# Patient Record
Sex: MALE | Race: BLACK OR AFRICAN AMERICAN | URBAN - METROPOLITAN AREA
[De-identification: names, ages, dates, MRNs, and addresses within clinical notes are randomized per-mention and may not be internally consistent; named-entity substitution may affect disease eponyms.]

---

## 2023-01-17 ENCOUNTER — WEB ENCOUNTER (OUTPATIENT)
Dept: URBAN - METROPOLITAN AREA CLINIC 92 | Facility: CLINIC | Age: 53
End: 2023-01-17

## 2023-01-17 ENCOUNTER — OFFICE VISIT (OUTPATIENT)
Dept: URBAN - METROPOLITAN AREA CLINIC 92 | Facility: CLINIC | Age: 53
End: 2023-01-17
Payer: MEDICARE

## 2023-01-17 ENCOUNTER — DASHBOARD ENCOUNTERS (OUTPATIENT)
Age: 53
End: 2023-01-17

## 2023-01-17 VITALS
DIASTOLIC BLOOD PRESSURE: 82 MMHG | WEIGHT: 297 LBS | HEIGHT: 71 IN | BODY MASS INDEX: 41.58 KG/M2 | HEART RATE: 75 BPM | TEMPERATURE: 97.3 F | SYSTOLIC BLOOD PRESSURE: 150 MMHG

## 2023-01-17 DIAGNOSIS — R19.7 DIARRHEA, UNSPECIFIED TYPE: ICD-10-CM

## 2023-01-17 DIAGNOSIS — K20.90 ESOPHAGITIS: ICD-10-CM

## 2023-01-17 DIAGNOSIS — K31.84 GASTROPARESIS: ICD-10-CM

## 2023-01-17 DIAGNOSIS — Z86.010 HISTORY OF COLON POLYPS: ICD-10-CM

## 2023-01-17 PROBLEM — 428283002: Status: ACTIVE | Noted: 2023-01-17

## 2023-01-17 PROBLEM — 235675006: Status: ACTIVE | Noted: 2023-01-17

## 2023-01-17 PROCEDURE — 99214 OFFICE O/P EST MOD 30 MIN: CPT

## 2023-01-17 RX ORDER — ONDANSETRON HYDROCHLORIDE 4 MG/1
TABLET, FILM COATED ORAL
Qty: 0 | Refills: 0 | Status: ACTIVE | COMMUNITY
Start: 2017-12-08

## 2023-01-17 RX ORDER — SEVELAMER CARBONATE 800 MG/1
TABLET, FILM COATED ORAL
Qty: 0 | Refills: 0 | Status: ACTIVE | COMMUNITY
Start: 2017-11-30

## 2023-01-17 RX ORDER — FOLIC ACID 1 MG/1
TABLET ORAL
Qty: 0 | Refills: 0 | Status: ACTIVE | COMMUNITY
Start: 2017-08-21

## 2023-01-17 RX ORDER — BENZONATATE 100 MG/1
CAPSULE ORAL
Qty: 0 | Refills: 0 | Status: ACTIVE | COMMUNITY
Start: 2017-09-28

## 2023-01-17 RX ORDER — CLINDAMYCIN HYDROCHLORIDE 150 MG/1
CAPSULE ORAL
Qty: 0 | Refills: 0 | Status: ACTIVE | COMMUNITY
Start: 2017-09-28

## 2023-01-17 RX ORDER — CINACALCET HYDROCHLORIDE 30 MG/1
TABLET, COATED ORAL
Qty: 0 | Refills: 0 | Status: ACTIVE | COMMUNITY
Start: 2017-09-12

## 2023-01-17 NOTE — HPI-TODAY'S VISIT:
52-year-old male presents today with complaints of diarrhea.    He was last seen by Dr. Pacheco in 2019.  Per chart review patient has a history of gastroparesis complicated by severe reflux esophagitis.  He had a colonoscopy and endoscopy on  that showed LA grade D erosive esophagitis and retained fluid in the stomach.  There was irregularly shaped ulcer along the greater curvature with an adherent clot.  Multiple biopsies were obtained that excluded malignancy and H. pylori.  The colonoscopy demonstrated retained stool and random biopsies excluded microscopic colitis.  He had a gastric emptying scan on  which revealed delayed emptying consistent with gastroparesis.  Most recent EGD 2-2020 revealed LA grade B esophagitis which showed squamous mucosa with reflux type features 2 cm hiatal hernia, single 5 mm sessile polyp which showed regenerative antral mucosa with mild chronic inflammation, clear fluid in the gastric body. He was on protonix 40 mg BID at that time.  Most recent colonoscopy 2-2020:Large amount of semisolid stool in entire colon, diverticula, internal hemorrhoids, repeat in 1 year due to suboptimal prep Per last note it was recommended if patient had persistent reflux esophagitis will consider referral for surgery for possible Niesen fundoplication. Today he states he cannot rememeber if he has had this yet.   Currently states he has been having increased diarrhea after eating meals for about 9 to 10 months.  He states after he eats a meal a few minutes later he will have a bout of loose stool.  He denies hematochezia, melena, weight loss, abdominal pain, nighttime sx.  He denies any family history of colon cancer, colon polyps, UC, Crohn's. He had a course of amoxicillin in October due to dental work but diarrhea started prior to this. He takes imodium prn for the diarrhea which helps.  He is not currently a diabetic but states prior to his dialysis treatment in 2017 he was.  He is currently on dialysis M,W,F.  He denies any history of pancreatitis. He also notes some fullness after he eats.  He does have a history of gastroparesis but has not seen a dietitian for this in the past.  He does have some nausea and vomiting 2-3 times a month.  Denies dysphagia, odynophagia, GERD symptoms, NSAID use.

## 2023-04-18 ENCOUNTER — OFFICE VISIT (OUTPATIENT)
Dept: URBAN - METROPOLITAN AREA CLINIC 29 | Facility: CLINIC | Age: 53
End: 2023-04-18

## 2023-05-16 ENCOUNTER — OFFICE VISIT (OUTPATIENT)
Dept: URBAN - METROPOLITAN AREA CLINIC 29 | Facility: CLINIC | Age: 53
End: 2023-05-16

## 2024-07-01 ENCOUNTER — OFFICE VISIT (OUTPATIENT)
Dept: URBAN - METROPOLITAN AREA CLINIC 92 | Facility: CLINIC | Age: 54
End: 2024-07-01
Payer: MEDICARE

## 2024-07-01 VITALS
WEIGHT: 294 LBS | TEMPERATURE: 97.7 F | HEIGHT: 71 IN | HEART RATE: 59 BPM | DIASTOLIC BLOOD PRESSURE: 80 MMHG | BODY MASS INDEX: 41.16 KG/M2 | SYSTOLIC BLOOD PRESSURE: 153 MMHG

## 2024-07-01 DIAGNOSIS — K21.00 GASTROESOPHAGEAL REFLUX DISEASE WITH ESOPHAGITIS WITHOUT HEMORRHAGE: ICD-10-CM

## 2024-07-01 DIAGNOSIS — R19.7 DIARRHEA, UNSPECIFIED TYPE: ICD-10-CM

## 2024-07-01 DIAGNOSIS — Z86.010 HISTORY OF COLON POLYPS: ICD-10-CM

## 2024-07-01 DIAGNOSIS — K31.84 GASTROPARESIS: ICD-10-CM

## 2024-07-01 PROBLEM — 266433003: Status: ACTIVE | Noted: 2024-07-01

## 2024-07-01 PROCEDURE — 99214 OFFICE O/P EST MOD 30 MIN: CPT | Performed by: INTERNAL MEDICINE

## 2024-07-01 RX ORDER — ATORVASTATIN CALCIUM 80 MG/1
1 TABLET TABLET, FILM COATED ORAL ONCE A DAY
Status: ACTIVE | COMMUNITY

## 2024-07-01 RX ORDER — NITROGLYCERIN 0.4 MG/1
AS DIRECTED TABLET SUBLINGUAL
Status: ACTIVE | COMMUNITY

## 2024-07-01 RX ORDER — CARVEDILOL 3.12 MG/1
1 TABLET WITH FOOD TABLET, FILM COATED ORAL TWICE A DAY
Status: ACTIVE | COMMUNITY

## 2024-07-01 RX ORDER — MONTELUKAST 10 MG/1
1 TABLET TABLET, FILM COATED ORAL ONCE A DAY
Status: ACTIVE | COMMUNITY

## 2024-07-01 RX ORDER — CALCITRIOL 0.5 UG/1
1 CAPSULE CAPSULE, LIQUID FILLED ORAL ONCE A DAY
Status: ACTIVE | COMMUNITY

## 2024-07-01 RX ORDER — ISOSORBIDE DINITRATE 20 MG/1
1 TABLET TABLET ORAL TWICE A DAY
Status: ACTIVE | COMMUNITY

## 2024-07-01 RX ORDER — HYDROXYZINE HYDROCHLORIDE 50 MG/1
1 TABLET AS NEEDED TABLET, FILM COATED ORAL ONCE A DAY
Status: ACTIVE | COMMUNITY

## 2024-07-01 RX ORDER — HYDRALAZINE HYDROCHLORIDE 100 MG/1
1 TABLET WITH FOOD TABLET, FILM COATED ORAL TWICE A DAY
Status: ACTIVE | COMMUNITY

## 2024-07-01 RX ORDER — SEMAGLUTIDE 0.68 MG/ML
AS DIRECTED INJECTION, SOLUTION SUBCUTANEOUS
Status: ACTIVE | COMMUNITY

## 2024-07-01 RX ORDER — AMIODARONE HCL 200 MG
1 TABLET TABLET ORAL ONCE A DAY
Status: ACTIVE | COMMUNITY

## 2024-07-01 RX ORDER — KRILL/OM-3/DHA/EPA/PHOSPHO/AST 1000-230MG
1 TABLET CAPSULE ORAL ONCE A DAY
Status: ACTIVE | COMMUNITY

## 2024-07-01 RX ORDER — SEVELAMER CARBONATE 800 MG/1
1 TABLET WITH MEALS TABLET, FILM COATED ORAL THREE TIMES A DAY
Status: ACTIVE | COMMUNITY

## 2024-07-01 RX ORDER — FOLIC ACID 1 MG/1
1 TABLET TABLET ORAL ONCE A DAY
Status: ACTIVE | COMMUNITY

## 2024-07-01 NOTE — HPI-TODAY'S VISIT:
This is a 53-year-old male who now presents for follow-up.  He has chronic diarrhea which she previously underwent colonoscopy in December 2017 with biopsies that exclude microscopic colitis.  He had a gastric emptying study that confirm delayed gastric emptying consistent with gastroparesis.  During his last visit, he was recommended to obtain stool studies and fecal elastase but did not complete the test. he continues to have chronic diarrhea with frequent liquids bowel movements 3 times a week without overt bleeding.  There is associated fever, chills, or constitutional symptoms including unintentional weight loss.  He continues to reflux that is controlled on Protonix.  He underwent EGD on 02/11/2020 that showed LA grade B esophagitis and 2 cm hiatal hernia.  Biopsying confirm reflux changes and there was a chronic inflammation without H pylori and regenerative inflammatory polyp in the stomach.  Colonoscopy on 02/11/2020 showed retained stool and diverticulosis in the ascending, descending, and sigmoid colon and internal hemorrhoids. He was recommended to repeat the procedure in 1 year but has not yet done so.  He has ESRD and is on HD on MWF.  He recently had acute MI on 03/07/2024 that required PCI with 4 stent placement.  He developed cardiac arrest and subsequent coli requiring AICD placement.  He is currently on ASA and Brilinta

## 2024-07-08 ENCOUNTER — LAB OUTSIDE AN ENCOUNTER (OUTPATIENT)
Dept: URBAN - METROPOLITAN AREA CLINIC 92 | Facility: CLINIC | Age: 54
End: 2024-07-08

## 2024-07-10 LAB
ADENOVIRUS F 40/41: NOT DETECTED
CAMPYLOBACTER: NOT DETECTED
CLOSTRIDIUM DIFFICILE: NOT DETECTED
ENTAMOEBA HISTOLYTICA: NOT DETECTED
ENTEROAGGREGATIVE E.COLI: NOT DETECTED
ENTEROTOXIGENIC E.COLI: NOT DETECTED
ESCHERICHIA COLI O157: NOT DETECTED
GIARDIA LAMBLIA: NOT DETECTED
NOROVIRUS GI/GII: NOT DETECTED
ROTAVIRUS A: NOT DETECTED
SALMONELLA SPP.: NOT DETECTED
SHIGA-LIKE TOXIN PRODUCING E.COLI: NOT DETECTED
SHIGELLA SPP. / ENTEROINVASIVE E.COLI: NOT DETECTED
VIBRIO PARAHAEMOLYTICUS: NOT DETECTED
VIBRIO SPP.: NOT DETECTED
YERSINIA ENTEROCOLITICA: NOT DETECTED

## 2024-07-25 ENCOUNTER — OFFICE VISIT (OUTPATIENT)
Dept: URBAN - METROPOLITAN AREA CLINIC 92 | Facility: CLINIC | Age: 54
End: 2024-07-25

## 2024-07-25 RX ORDER — FOLIC ACID 1 MG/1
1 TABLET TABLET ORAL ONCE A DAY
COMMUNITY

## 2024-07-25 RX ORDER — SEVELAMER CARBONATE 800 MG/1
1 TABLET WITH MEALS TABLET, FILM COATED ORAL THREE TIMES A DAY
COMMUNITY

## 2024-07-25 RX ORDER — AMIODARONE HCL 200 MG
1 TABLET TABLET ORAL ONCE A DAY
COMMUNITY

## 2024-07-25 RX ORDER — NITROGLYCERIN 0.4 MG/1
AS DIRECTED TABLET SUBLINGUAL
COMMUNITY

## 2024-07-25 RX ORDER — ISOSORBIDE DINITRATE 20 MG/1
1 TABLET TABLET ORAL TWICE A DAY
COMMUNITY

## 2024-07-25 RX ORDER — KRILL/OM-3/DHA/EPA/PHOSPHO/AST 1000-230MG
1 TABLET CAPSULE ORAL ONCE A DAY
COMMUNITY

## 2024-07-25 RX ORDER — MONTELUKAST 10 MG/1
1 TABLET TABLET, FILM COATED ORAL ONCE A DAY
COMMUNITY

## 2024-07-25 RX ORDER — HYDRALAZINE HYDROCHLORIDE 100 MG/1
1 TABLET WITH FOOD TABLET, FILM COATED ORAL TWICE A DAY
COMMUNITY

## 2024-07-25 RX ORDER — SEMAGLUTIDE 0.68 MG/ML
AS DIRECTED INJECTION, SOLUTION SUBCUTANEOUS
COMMUNITY

## 2024-07-25 RX ORDER — CALCITRIOL 0.5 UG/1
1 CAPSULE CAPSULE, LIQUID FILLED ORAL ONCE A DAY
COMMUNITY

## 2024-07-25 RX ORDER — ATORVASTATIN CALCIUM 80 MG/1
1 TABLET TABLET, FILM COATED ORAL ONCE A DAY
COMMUNITY

## 2024-07-25 RX ORDER — CARVEDILOL 3.12 MG/1
1 TABLET WITH FOOD TABLET, FILM COATED ORAL TWICE A DAY
COMMUNITY

## 2024-07-25 RX ORDER — HYDROXYZINE HYDROCHLORIDE 50 MG/1
1 TABLET AS NEEDED TABLET, FILM COATED ORAL ONCE A DAY
COMMUNITY

## 2024-08-13 ENCOUNTER — OFFICE VISIT (OUTPATIENT)
Dept: URBAN - METROPOLITAN AREA CLINIC 92 | Facility: CLINIC | Age: 54
End: 2024-08-13
Payer: MEDICARE

## 2024-08-13 VITALS
SYSTOLIC BLOOD PRESSURE: 174 MMHG | WEIGHT: 280 LBS | TEMPERATURE: 97.6 F | DIASTOLIC BLOOD PRESSURE: 106 MMHG | BODY MASS INDEX: 39.2 KG/M2 | HEART RATE: 93 BPM | HEIGHT: 71 IN

## 2024-08-13 DIAGNOSIS — Z86.010 HISTORY OF COLON POLYPS: ICD-10-CM

## 2024-08-13 DIAGNOSIS — K21.00 GASTROESOPHAGEAL REFLUX DISEASE WITH ESOPHAGITIS WITHOUT HEMORRHAGE: ICD-10-CM

## 2024-08-13 DIAGNOSIS — K31.84 GASTROPARESIS: ICD-10-CM

## 2024-08-13 DIAGNOSIS — R19.7 DIARRHEA, UNSPECIFIED TYPE: ICD-10-CM

## 2024-08-13 PROCEDURE — 99213 OFFICE O/P EST LOW 20 MIN: CPT

## 2024-08-13 RX ORDER — FOLIC ACID 1 MG/1
1 TABLET TABLET ORAL ONCE A DAY
Status: ACTIVE | COMMUNITY

## 2024-08-13 RX ORDER — CARVEDILOL 3.12 MG/1
1 TABLET WITH FOOD TABLET, FILM COATED ORAL TWICE A DAY
Status: ACTIVE | COMMUNITY

## 2024-08-13 RX ORDER — AMIODARONE HCL 200 MG
1 TABLET TABLET ORAL ONCE A DAY
Status: ACTIVE | COMMUNITY

## 2024-08-13 RX ORDER — CALCITRIOL 0.5 UG/1
1 CAPSULE CAPSULE, LIQUID FILLED ORAL ONCE A DAY
Status: ACTIVE | COMMUNITY

## 2024-08-13 RX ORDER — ISOSORBIDE DINITRATE 20 MG/1
1 TABLET TABLET ORAL TWICE A DAY
Status: ACTIVE | COMMUNITY

## 2024-08-13 RX ORDER — NITROGLYCERIN 0.4 MG/1
AS DIRECTED TABLET SUBLINGUAL
Status: ACTIVE | COMMUNITY

## 2024-08-13 RX ORDER — HYDRALAZINE HYDROCHLORIDE 100 MG/1
1 TABLET WITH FOOD TABLET, FILM COATED ORAL TWICE A DAY
Status: ACTIVE | COMMUNITY

## 2024-08-13 RX ORDER — KRILL/OM-3/DHA/EPA/PHOSPHO/AST 1000-230MG
1 TABLET CAPSULE ORAL ONCE A DAY
Status: ACTIVE | COMMUNITY

## 2024-08-13 RX ORDER — ATORVASTATIN CALCIUM 80 MG/1
1 TABLET TABLET, FILM COATED ORAL ONCE A DAY
Status: ACTIVE | COMMUNITY

## 2024-08-13 RX ORDER — HYDROXYZINE HYDROCHLORIDE 50 MG/1
1 TABLET AS NEEDED TABLET, FILM COATED ORAL ONCE A DAY
Status: ACTIVE | COMMUNITY

## 2024-08-13 RX ORDER — SEMAGLUTIDE 0.68 MG/ML
AS DIRECTED INJECTION, SOLUTION SUBCUTANEOUS
Status: ACTIVE | COMMUNITY

## 2024-08-13 RX ORDER — MONTELUKAST 10 MG/1
1 TABLET TABLET, FILM COATED ORAL ONCE A DAY
Status: ACTIVE | COMMUNITY

## 2024-08-13 RX ORDER — SEVELAMER CARBONATE 800 MG/1
1 TABLET WITH MEALS TABLET, FILM COATED ORAL THREE TIMES A DAY
Status: ACTIVE | COMMUNITY

## 2024-08-13 NOTE — HPI-TODAY'S VISIT:
This is a 53-year-old male who now presents for follow-up.  He has chronic diarrhea which she previously underwent colonoscopy in December 2017 with biopsies that exclude microscopic colitis.  He had a gastric emptying study that confirm delayed gastric emptying consistent with gastroparesis.  He had infectious stool panel after last visit but did not have pancreatic elastase or calpro done. His infecion panel was neg. He continues to have chronic diarrhea with frequent liquids bowel movements 1-2 times a week without overt bleeding.  There is associated fever, chills, or constitutional symptoms including unintentional weight loss.   He underwent EGD on 02/11/2020 that showed LA grade B esophagitis and 2 cm hiatal hernia.  Biopsying confirm reflux changes and there was a chronic inflammation without H pylori and regenerative inflammatory polyp in the stomach.  Colonoscopy on 02/11/2020 showed retained stool and diverticulosis in the ascending, descending, and sigmoid colon and internal hemorrhoids. He was recommended to repeat the procedure in 1 year but has not yet done so.  He has ESRD and is on HD on MWF.  He recently had acute MI on 03/07/2024 that required PCI with 4 stent placement.  He developed cardiac arrest and subsequent coli requiring AICD placement.  He is currently on ASA and Brilinta  He was seen at [place] ED last week due to n/v/d. He had labs that showed normal lipase and lfts. CT showed no acute findings. Sx resolved on their own one day after hospital visit.

## 2024-09-17 ENCOUNTER — TELEPHONE ENCOUNTER (OUTPATIENT)
Dept: URBAN - METROPOLITAN AREA CLINIC 92 | Facility: CLINIC | Age: 54
End: 2024-09-17

## 2024-09-17 RX ORDER — PANCRELIPASE LIPASE, PANCRELIPASE PROTEASE, PANCRELIPASE AMYLASE 40000; 126000; 168000 [USP'U]/1; [USP'U]/1; [USP'U]/1
2 CAPSULES WITH MEALS AND 1 WITH SNACKS CAPSULE, DELAYED RELEASE ORAL
Qty: 900 CAPSULES | Refills: 3 | OUTPATIENT
Start: 2024-09-17 | End: 2025-09-12

## 2024-09-18 ENCOUNTER — TELEPHONE ENCOUNTER (OUTPATIENT)
Dept: URBAN - METROPOLITAN AREA CLINIC 92 | Facility: CLINIC | Age: 54
End: 2024-09-18

## 2024-10-15 ENCOUNTER — OFFICE VISIT (OUTPATIENT)
Dept: URBAN - METROPOLITAN AREA CLINIC 92 | Facility: CLINIC | Age: 54
End: 2024-10-15

## 2024-10-15 RX ORDER — ISOSORBIDE DINITRATE 20 MG/1
1 TABLET TABLET ORAL TWICE A DAY
COMMUNITY

## 2024-10-15 RX ORDER — HYDROXYZINE HYDROCHLORIDE 50 MG/1
1 TABLET AS NEEDED TABLET, FILM COATED ORAL ONCE A DAY
COMMUNITY

## 2024-10-15 RX ORDER — PANCRELIPASE LIPASE, PANCRELIPASE PROTEASE, PANCRELIPASE AMYLASE 40000; 126000; 168000 [USP'U]/1; [USP'U]/1; [USP'U]/1
2 CAPSULES WITH MEALS AND 1 WITH SNACKS CAPSULE, DELAYED RELEASE ORAL
Qty: 900 CAPSULES | Refills: 3 | COMMUNITY
Start: 2024-09-17 | End: 2025-09-12

## 2024-10-15 RX ORDER — MONTELUKAST 10 MG/1
1 TABLET TABLET, FILM COATED ORAL ONCE A DAY
COMMUNITY

## 2024-10-15 RX ORDER — SEMAGLUTIDE 0.68 MG/ML
AS DIRECTED INJECTION, SOLUTION SUBCUTANEOUS
COMMUNITY

## 2024-10-15 RX ORDER — AMIODARONE HCL 200 MG
1 TABLET TABLET ORAL ONCE A DAY
COMMUNITY

## 2024-10-15 RX ORDER — ATORVASTATIN CALCIUM 80 MG/1
1 TABLET TABLET, FILM COATED ORAL ONCE A DAY
COMMUNITY

## 2024-10-15 RX ORDER — SEVELAMER CARBONATE 800 MG/1
1 TABLET WITH MEALS TABLET, FILM COATED ORAL THREE TIMES A DAY
COMMUNITY

## 2024-10-15 RX ORDER — KRILL/OM-3/DHA/EPA/PHOSPHO/AST 1000-230MG
1 TABLET CAPSULE ORAL ONCE A DAY
COMMUNITY

## 2024-10-15 RX ORDER — NITROGLYCERIN 0.4 MG/1
AS DIRECTED TABLET SUBLINGUAL
COMMUNITY

## 2024-10-15 RX ORDER — CALCITRIOL 0.5 UG/1
1 CAPSULE CAPSULE, LIQUID FILLED ORAL ONCE A DAY
COMMUNITY

## 2024-10-15 RX ORDER — PANCRELIPASE LIPASE, PANCRELIPASE PROTEASE, PANCRELIPASE AMYLASE 40000; 126000; 168000 [USP'U]/1; [USP'U]/1; [USP'U]/1
2 CAPSULES WITH MEALS AND 1 WITH SNACKS CAPSULE, DELAYED RELEASE ORAL
Qty: 900 CAPSULES | Refills: 3 | OUTPATIENT

## 2024-10-15 RX ORDER — HYDRALAZINE HYDROCHLORIDE 100 MG/1
1 TABLET WITH FOOD TABLET, FILM COATED ORAL TWICE A DAY
COMMUNITY

## 2024-10-15 RX ORDER — FOLIC ACID 1 MG/1
1 TABLET TABLET ORAL ONCE A DAY
COMMUNITY

## 2024-10-15 RX ORDER — CARVEDILOL 3.12 MG/1
1 TABLET WITH FOOD TABLET, FILM COATED ORAL TWICE A DAY
COMMUNITY

## 2024-10-15 NOTE — HPI-TODAY'S VISIT:
This is a 53-year-old male who now presents for follow-up.  He has chronic diarrhea which she previously underwent colonoscopy in December 2017 with biopsies that exclude microscopic colitis.  He had a gastric emptying study that confirm delayed gastric emptying consistent with gastroparesis.  He had infectious stool panel after last visit but did not have pancreatic elastase or calpro done. His infecion panel was neg. He continues to have chronic diarrhea with frequent liquids bowel movements 1-2 times a week without overt bleeding.  There is associated fever, chills, or constitutional symptoms including unintentional weight loss.   He underwent EGD on 02/11/2020 that showed LA grade B esophagitis and 2 cm hiatal hernia.  Biopsying confirm reflux changes and there was a chronic inflammation without H pylori and regenerative inflammatory polyp in the stomach.  Colonoscopy on 02/11/2020 showed retained stool and diverticulosis in the ascending, descending, and sigmoid colon and internal hemorrhoids. He was recommended to repeat the procedure in 1 year but has not yet done so.  He has ESRD and is on HD on MWF.  He recently had acute MI on 03/07/2024 that required PCI with 4 stent placement.  He developed cardiac arrest and subsequent coli requiring AICD placement.  He is currently on ASA and Brilinta  He was seen at [place] ED last week due to n/v/d. He had labs that showed normal lipase and lfts. CT showed no acute findings. Sx resolved on their own one day after hospital visit. After last visit we obtained stool studies that demonstrated Sy Pro 12, pancreatic elastase 42 he was sent [facility] CT from the ER 8-6/24 did not demonstrate any abnormalities of the pancreas since